# Patient Record
Sex: FEMALE | Race: WHITE
[De-identification: names, ages, dates, MRNs, and addresses within clinical notes are randomized per-mention and may not be internally consistent; named-entity substitution may affect disease eponyms.]

---

## 2017-10-18 ENCOUNTER — HOSPITAL ENCOUNTER (OUTPATIENT)
Dept: HOSPITAL 92 - LABBT | Age: 67
Discharge: HOME | End: 2017-10-18
Attending: NEUROLOGICAL SURGERY
Payer: MEDICARE

## 2017-10-18 DIAGNOSIS — M47.22: ICD-10-CM

## 2017-10-18 DIAGNOSIS — Z01.818: Primary | ICD-10-CM

## 2017-10-18 DIAGNOSIS — M48.02: ICD-10-CM

## 2017-10-18 DIAGNOSIS — M47.12: ICD-10-CM

## 2017-10-18 LAB
ANION GAP SERPL CALC-SCNC: 13 MMOL/L (ref 10–20)
APTT PPP: 36.9 SEC (ref 22.9–36.1)
BUN SERPL-MCNC: 10 MG/DL (ref 9.8–20.1)
CALCIUM SERPL-MCNC: 9.6 MG/DL (ref 7.8–10.44)
CHLORIDE SERPL-SCNC: 94 MMOL/L (ref 98–107)
CO2 SERPL-SCNC: 30 MMOL/L (ref 23–31)
CREAT CL PREDICTED SERPL C-G-VRATE: 0 ML/MIN (ref 70–130)
HCT VFR BLD CALC: 35.9 % (ref 36–47)
PROTHROMBIN TIME: 12.9 SEC (ref 12–14.7)
RBC # BLD AUTO: 4.44 MILL/UL (ref 4.2–5.4)
WBC # BLD AUTO: 7.6 THOU/UL (ref 4.8–10.8)

## 2017-10-18 PROCEDURE — 93005 ELECTROCARDIOGRAM TRACING: CPT

## 2017-10-18 PROCEDURE — 80048 BASIC METABOLIC PNL TOTAL CA: CPT

## 2017-10-18 PROCEDURE — 85730 THROMBOPLASTIN TIME PARTIAL: CPT

## 2017-10-18 PROCEDURE — 85610 PROTHROMBIN TIME: CPT

## 2017-10-18 PROCEDURE — 93010 ELECTROCARDIOGRAM REPORT: CPT

## 2017-10-18 PROCEDURE — 85027 COMPLETE CBC AUTOMATED: CPT

## 2017-10-18 NOTE — EKG
Test Reason : 

Blood Pressure : ***/*** mmHG

Vent. Rate : 058 BPM     Atrial Rate : 058 BPM

   P-R Int : 174 ms          QRS Dur : 158 ms

    QT Int : 502 ms       P-R-T Axes : 049 -56 -38 degrees

   QTc Int : 492 ms

 

Sinus bradycardia

Right bundle branch block

Left anterior fascicular block

*** Bifascicular block ***

Moderate voltage criteria for LVH, may be normal variant

T wave abnormality, consider inferolateral ischemia

Abnormal ECG

Confirmed by CARMEN JUAREZ ([id number]) on 10/18/2017 2:53:59 PM

 

Referred By:  [id number]           Confirmed By:CARMEN JUAREZ

## 2021-03-16 ENCOUNTER — HOSPITAL ENCOUNTER (OUTPATIENT)
Dept: HOSPITAL 92 - LABBT | Age: 71
Discharge: HOME | End: 2021-03-16
Attending: SURGERY
Payer: MEDICARE

## 2021-03-16 DIAGNOSIS — Z20.822: ICD-10-CM

## 2021-03-16 DIAGNOSIS — Z01.818: Primary | ICD-10-CM

## 2021-03-16 DIAGNOSIS — K64.9: ICD-10-CM

## 2021-03-16 LAB
ALBUMIN SERPL BCG-MCNC: 4.3 G/DL (ref 3.4–4.8)
ALP SERPL-CCNC: 88 U/L (ref 40–110)
ALT SERPL W P-5'-P-CCNC: 17 U/L (ref 8–55)
ANION GAP SERPL CALC-SCNC: 15 MMOL/L (ref 10–20)
AST SERPL-CCNC: 16 U/L (ref 5–34)
BASOPHILS # BLD AUTO: 0.2 10X3/UL (ref 0–0.2)
BASOPHILS NFR BLD AUTO: 1.7 % (ref 0–2)
BILIRUB SERPL-MCNC: 0.3 MG/DL (ref 0.2–1.2)
BUN SERPL-MCNC: 13 MG/DL (ref 9.8–20.1)
CALCIUM SERPL-MCNC: 9.3 MG/DL (ref 7.8–10.44)
CHLORIDE SERPL-SCNC: 99 MMOL/L (ref 98–107)
CO2 SERPL-SCNC: 28 MMOL/L (ref 23–31)
CREAT CL PREDICTED SERPL C-G-VRATE: 0 ML/MIN (ref 70–130)
EOSINOPHIL # BLD AUTO: 0.3 10X3/UL (ref 0–0.5)
EOSINOPHIL NFR BLD AUTO: 3.7 % (ref 0–6)
GLOBULIN SER CALC-MCNC: 2.6 G/DL (ref 2.4–3.5)
GLUCOSE SERPL-MCNC: 106 MG/DL (ref 83–110)
HGB BLD-MCNC: 11.4 G/DL (ref 12–15.5)
LYMPHOCYTES NFR BLD AUTO: 41.9 % (ref 18–47)
MCH RBC QN AUTO: 25.5 PG (ref 27–33)
MCV RBC AUTO: 79.4 FL (ref 81.6–98.3)
MONOCYTES # BLD AUTO: 0.8 10X3/UL (ref 0–1.1)
MONOCYTES NFR BLD AUTO: 8.5 % (ref 0–10)
NEUTROPHILS # BLD AUTO: 4 10X3/UL (ref 1.5–8.4)
NEUTROPHILS NFR BLD AUTO: 44 % (ref 40–75)
PLATELET # BLD AUTO: 380 10X3/UL (ref 150–450)
POTASSIUM SERPL-SCNC: 4 MMOL/L (ref 3.5–5.1)
RBC # BLD AUTO: 4.47 10X6/UL (ref 3.9–5.03)
SODIUM SERPL-SCNC: 138 MMOL/L (ref 136–145)
WBC # BLD AUTO: 9 10X3/UL (ref 3.5–10.5)

## 2021-03-16 PROCEDURE — 85025 COMPLETE CBC W/AUTO DIFF WBC: CPT

## 2021-03-16 PROCEDURE — 93010 ELECTROCARDIOGRAM REPORT: CPT

## 2021-03-16 PROCEDURE — 87635 SARS-COV-2 COVID-19 AMP PRB: CPT

## 2021-03-16 PROCEDURE — 80053 COMPREHEN METABOLIC PANEL: CPT

## 2021-03-16 PROCEDURE — U0005 INFEC AGEN DETEC AMPLI PROBE: HCPCS

## 2021-03-16 PROCEDURE — 93005 ELECTROCARDIOGRAM TRACING: CPT

## 2021-03-16 PROCEDURE — U0003 INFECTIOUS AGENT DETECTION BY NUCLEIC ACID (DNA OR RNA); SEVERE ACUTE RESPIRATORY SYNDROME CORONAVIRUS 2 (SARS-COV-2) (CORONAVIRUS DISEASE [COVID-19]), AMPLIFIED PROBE TECHNIQUE, MAKING USE OF HIGH THROUGHPUT TECHNOLOGIES AS DESCRIBED BY CMS-2020-01-R: HCPCS

## 2021-11-16 ENCOUNTER — HOSPITAL ENCOUNTER (OUTPATIENT)
Dept: HOSPITAL 92 - LABBT | Age: 71
Discharge: HOME | End: 2021-11-16
Attending: SURGERY
Payer: MEDICARE

## 2021-11-16 DIAGNOSIS — Z01.812: Primary | ICD-10-CM

## 2021-11-16 DIAGNOSIS — K64.9: ICD-10-CM

## 2021-11-16 DIAGNOSIS — Z20.822: ICD-10-CM

## 2021-11-16 LAB
ALBUMIN SERPL BCG-MCNC: 4.3 G/DL (ref 3.4–4.8)
ALP SERPL-CCNC: 95 U/L (ref 40–110)
ALT SERPL W P-5'-P-CCNC: 17 U/L (ref 8–55)
ANION GAP SERPL CALC-SCNC: 17 MMOL/L (ref 10–20)
AST SERPL-CCNC: 17 U/L (ref 5–34)
BASOPHILS # BLD AUTO: 0.2 10X3/UL (ref 0–0.2)
BASOPHILS NFR BLD AUTO: 2.1 % (ref 0–2)
BILIRUB SERPL-MCNC: 0.3 MG/DL (ref 0.2–1.2)
BUN SERPL-MCNC: 16 MG/DL (ref 9.8–20.1)
CALCIUM SERPL-MCNC: 9.2 MG/DL (ref 7.8–10.44)
CHLORIDE SERPL-SCNC: 105 MMOL/L (ref 98–107)
CO2 SERPL-SCNC: 24 MMOL/L (ref 23–31)
CREAT CL PREDICTED SERPL C-G-VRATE: 0 ML/MIN (ref 70–130)
EOSINOPHIL # BLD AUTO: 0.4 10X3/UL (ref 0–0.5)
EOSINOPHIL NFR BLD AUTO: 4.8 % (ref 0–6)
GLOBULIN SER CALC-MCNC: 2.8 G/DL (ref 2.4–3.5)
GLUCOSE SERPL-MCNC: 124 MG/DL (ref 83–110)
HGB BLD-MCNC: 10.6 G/DL (ref 12–15.5)
LYMPHOCYTES NFR BLD AUTO: 35.2 % (ref 18–47)
MCH RBC QN AUTO: 23.3 PG (ref 27–33)
MCV RBC AUTO: 75.6 FL (ref 81.6–98.3)
MONOCYTES # BLD AUTO: 0.6 10X3/UL (ref 0–1.1)
MONOCYTES NFR BLD AUTO: 6.9 % (ref 0–10)
NEUTROPHILS # BLD AUTO: 4.1 10X3/UL (ref 1.5–8.4)
NEUTROPHILS NFR BLD AUTO: 50.7 % (ref 40–75)
PLATELET # BLD AUTO: 448 10X3/UL (ref 150–450)
POTASSIUM SERPL-SCNC: 4.5 MMOL/L (ref 3.5–5.1)
RBC # BLD AUTO: 4.54 10X6/UL (ref 3.9–5.03)
SODIUM SERPL-SCNC: 141 MMOL/L (ref 136–145)
WBC # BLD AUTO: 8 10X3/UL (ref 3.5–10.5)

## 2021-11-16 PROCEDURE — 85025 COMPLETE CBC W/AUTO DIFF WBC: CPT

## 2021-11-16 PROCEDURE — 80053 COMPREHEN METABOLIC PANEL: CPT

## 2021-11-16 PROCEDURE — U0005 INFEC AGEN DETEC AMPLI PROBE: HCPCS

## 2021-11-16 PROCEDURE — U0003 INFECTIOUS AGENT DETECTION BY NUCLEIC ACID (DNA OR RNA); SEVERE ACUTE RESPIRATORY SYNDROME CORONAVIRUS 2 (SARS-COV-2) (CORONAVIRUS DISEASE [COVID-19]), AMPLIFIED PROBE TECHNIQUE, MAKING USE OF HIGH THROUGHPUT TECHNOLOGIES AS DESCRIBED BY CMS-2020-01-R: HCPCS

## 2021-11-19 ENCOUNTER — HOSPITAL ENCOUNTER (OUTPATIENT)
Dept: HOSPITAL 92 - SDC | Age: 71
Discharge: HOME | End: 2021-11-19
Attending: SURGERY
Payer: MEDICARE

## 2021-11-19 VITALS — BODY MASS INDEX: 33.2 KG/M2

## 2021-11-19 DIAGNOSIS — E11.43: ICD-10-CM

## 2021-11-19 DIAGNOSIS — Z91.018: ICD-10-CM

## 2021-11-19 DIAGNOSIS — K64.2: Primary | ICD-10-CM

## 2021-11-19 DIAGNOSIS — K31.84: ICD-10-CM

## 2021-11-19 DIAGNOSIS — K21.9: ICD-10-CM

## 2021-11-19 DIAGNOSIS — Z88.8: ICD-10-CM

## 2021-11-19 DIAGNOSIS — Z88.0: ICD-10-CM

## 2021-11-19 DIAGNOSIS — E03.9: ICD-10-CM

## 2021-11-19 DIAGNOSIS — I10: ICD-10-CM

## 2021-11-19 DIAGNOSIS — Z88.1: ICD-10-CM

## 2021-11-19 DIAGNOSIS — Z88.5: ICD-10-CM

## 2021-11-19 DIAGNOSIS — G47.33: ICD-10-CM

## 2021-11-19 DIAGNOSIS — Z79.899: ICD-10-CM

## 2021-11-19 PROCEDURE — 88304 TISSUE EXAM BY PATHOLOGIST: CPT

## 2021-11-19 PROCEDURE — 06BY3ZC EXCISION OF HEMORRHOIDAL PLEXUS, PERCUTANEOUS APPROACH: ICD-10-PCS | Performed by: SURGERY

## 2021-11-19 PROCEDURE — S0020 INJECTION, BUPIVICAINE HYDRO: HCPCS
